# Patient Record
Sex: MALE | Race: WHITE | URBAN - METROPOLITAN AREA
[De-identification: names, ages, dates, MRNs, and addresses within clinical notes are randomized per-mention and may not be internally consistent; named-entity substitution may affect disease eponyms.]

---

## 2022-08-17 ENCOUNTER — OFFICE VISIT (OUTPATIENT)
Dept: URBAN - METROPOLITAN AREA CLINIC 7 | Facility: CLINIC | Age: 77
End: 2022-08-17
Payer: COMMERCIAL

## 2022-08-17 DIAGNOSIS — H43.812 VITREOUS DEGENERATION, LEFT EYE: ICD-10-CM

## 2022-08-17 DIAGNOSIS — H25.13 AGE-RELATED NUCLEAR CATARACT, BILATERAL: ICD-10-CM

## 2022-08-17 DIAGNOSIS — H53.2 DIPLOPIA: Primary | ICD-10-CM

## 2022-08-17 PROCEDURE — 99204 OFFICE O/P NEW MOD 45 MIN: CPT | Performed by: OPHTHALMOLOGY

## 2022-08-17 PROCEDURE — 92134 CPTRZ OPH DX IMG PST SGM RTA: CPT | Performed by: OPHTHALMOLOGY

## 2022-08-17 ASSESSMENT — INTRAOCULAR PRESSURE
OS: 23
OD: 26

## 2022-08-17 NOTE — IMPRESSION/PLAN
Impression: Age-related nuclear cataract OU Plan: Dense. Patient is cleared for CE/IOL OU from retina standpoint.

## 2022-08-17 NOTE — IMPRESSION/PLAN
Impression: Diplopia OD Plan: Pt c/o monocular diplopia OD. Exam/OCT show healthy nerve/macula. Most likely refractive. Recommend CE/IOL. PRN